# Patient Record
Sex: FEMALE | Employment: FULL TIME | URBAN - METROPOLITAN AREA
[De-identification: names, ages, dates, MRNs, and addresses within clinical notes are randomized per-mention and may not be internally consistent; named-entity substitution may affect disease eponyms.]

---

## 2024-02-29 ENCOUNTER — OFFICE VISIT (OUTPATIENT)
Dept: OBSTETRICS AND GYNECOLOGY | Facility: CLINIC | Age: 59
End: 2024-02-29
Payer: COMMERCIAL

## 2024-02-29 VITALS
BODY MASS INDEX: 24.12 KG/M2 | SYSTOLIC BLOOD PRESSURE: 135 MMHG | WEIGHT: 141.3 LBS | DIASTOLIC BLOOD PRESSURE: 72 MMHG | HEIGHT: 64 IN

## 2024-02-29 DIAGNOSIS — N81.10 CYSTOCELE WITH RECTOCELE: Primary | ICD-10-CM

## 2024-02-29 DIAGNOSIS — N81.6 CYSTOCELE WITH RECTOCELE: Primary | ICD-10-CM

## 2024-02-29 DIAGNOSIS — Z12.4 PAP SMEAR FOR CERVICAL CANCER SCREENING: ICD-10-CM

## 2024-02-29 PROCEDURE — 87624 HPV HI-RISK TYP POOLED RSLT: CPT

## 2024-02-29 PROCEDURE — 99203 OFFICE O/P NEW LOW 30 MIN: CPT | Performed by: OBSTETRICS & GYNECOLOGY

## 2024-02-29 PROCEDURE — 88175 CYTOPATH C/V AUTO FLUID REDO: CPT

## 2024-02-29 PROCEDURE — 1036F TOBACCO NON-USER: CPT | Performed by: OBSTETRICS & GYNECOLOGY

## 2024-02-29 NOTE — PROGRESS NOTES
Subjective   Patient ID: Melanie Rivera is a 58 y.o. female who presents for Prolapse (Patient here for possible prolapse--pt states she has a pink bulge in vagina/Pt denies any pain or difficulty with bowels or urinating/Hrt= none/Declined chaperone).  HPI  Patient is a 58-year-old  3 para 3 white female whose had 3 spontaneous vaginal deliveries.  Patient has been menopausal since age 44 she does not use hormone therapy denies any vaginal bleeding.  She admits to regular bowel movements.  Denies any urinary symptoms until about 6 weeks ago and occasionally the urine will spray to the side due to a bulge in the vagina.  She said she did not notice the bulge until 6 weeks ago it appears like a bubble from bubblegum in the opening of her bladder.  No pain or discomfort no vaginal bleeding.  Medical history significant for myocardial infarction at age 41 she had stent placed is on maintenance medication.  Denies cigarette smoking currently works as a .  Family history significant for mother with possible female cancer but uncertain.  Patient has not seen a gynecologist in about 5 years.  Has a remote history of abnormal Pap with LEEP procedure but states her Pap smears were normal after that.  Denies history of ovarian cyst, fibroids, endometriosis or pelvic infections.  Review of Systems    Objective   Physical Exam   Pelvic: External genitalia mildly atrophic, the vagina mild rectocele mild uterine prolapse cervix is clean.  Pap smear was done uterus is small freely mobile nontender no palpable adnexal masses or tenderness.  Assessment/Plan   Cystorectocele, does complain of occasional splaying of urination.  Patient would like to follow expectantly.  Did recommend Kegel's exercises.  Pap smear done.  Will contact patient with results.         Kareem Perkins MD 24 9:33 AM

## 2024-03-13 LAB
CYTOLOGY CMNT CVX/VAG CYTO-IMP: NORMAL
HPV HR 12 DNA GENITAL QL NAA+PROBE: NEGATIVE
HPV HR GENOTYPES PNL CVX NAA+PROBE: NEGATIVE
HPV16 DNA SPEC QL NAA+PROBE: NEGATIVE
HPV18 DNA SPEC QL NAA+PROBE: NEGATIVE
LAB AP HPV GENOTYPE QUESTION: YES
LAB AP HPV HR: NORMAL
LABORATORY COMMENT REPORT: NORMAL
PATH REPORT.TOTAL CANCER: NORMAL